# Patient Record
Sex: FEMALE | Race: BLACK OR AFRICAN AMERICAN | NOT HISPANIC OR LATINO | Employment: FULL TIME | URBAN - METROPOLITAN AREA
[De-identification: names, ages, dates, MRNs, and addresses within clinical notes are randomized per-mention and may not be internally consistent; named-entity substitution may affect disease eponyms.]

---

## 2020-09-20 ENCOUNTER — HOSPITAL ENCOUNTER (EMERGENCY)
Facility: HOSPITAL | Age: 65
Discharge: HOME | End: 2020-09-21
Attending: STUDENT IN AN ORGANIZED HEALTH CARE EDUCATION/TRAINING PROGRAM
Payer: COMMERCIAL

## 2020-09-20 DIAGNOSIS — R03.0 ELEVATED BLOOD PRESSURE READING: ICD-10-CM

## 2020-09-20 DIAGNOSIS — M25.559 HIP PAIN: ICD-10-CM

## 2020-09-20 LAB
ALBUMIN SERPL BCP-MCNC: 4.4 G/DL (ref 3.5–5.2)
ALP SERPL-CCNC: 56 U/L (ref 55–135)
ALT SERPL W/O P-5'-P-CCNC: 18 U/L (ref 10–44)
ANION GAP SERPL CALC-SCNC: 14 MMOL/L (ref 8–16)
AST SERPL-CCNC: 19 U/L (ref 10–40)
BASOPHILS # BLD AUTO: 0.05 K/UL (ref 0–0.2)
BASOPHILS NFR BLD: 0.6 % (ref 0–1.9)
BILIRUB SERPL-MCNC: 0.4 MG/DL (ref 0.1–1)
BILIRUB UR QL STRIP: NEGATIVE
BUN SERPL-MCNC: 11 MG/DL (ref 8–23)
CALCIUM SERPL-MCNC: 9.9 MG/DL (ref 8.7–10.5)
CHLORIDE SERPL-SCNC: 103 MMOL/L (ref 95–110)
CK SERPL-CCNC: 150 U/L (ref 20–180)
CLARITY UR: CLEAR
CO2 SERPL-SCNC: 22 MMOL/L (ref 23–29)
COLOR UR: COLORLESS
CREAT SERPL-MCNC: 0.8 MG/DL (ref 0.5–1.4)
D DIMER PPP IA.FEU-MCNC: 0.4 MG/L FEU
DIFFERENTIAL METHOD: ABNORMAL
EOSINOPHIL # BLD AUTO: 0.2 K/UL (ref 0–0.5)
EOSINOPHIL NFR BLD: 1.7 % (ref 0–8)
ERYTHROCYTE [DISTWIDTH] IN BLOOD BY AUTOMATED COUNT: 14 % (ref 11.5–14.5)
EST. GFR  (AFRICAN AMERICAN): >60 ML/MIN/1.73 M^2
EST. GFR  (NON AFRICAN AMERICAN): >60 ML/MIN/1.73 M^2
GLUCOSE SERPL-MCNC: 97 MG/DL (ref 70–110)
GLUCOSE UR QL STRIP: NEGATIVE
HCT VFR BLD AUTO: 39 % (ref 37–48.5)
HGB BLD-MCNC: 12.4 G/DL (ref 12–16)
HGB UR QL STRIP: NEGATIVE
IMM GRANULOCYTES # BLD AUTO: 0.04 K/UL (ref 0–0.04)
IMM GRANULOCYTES NFR BLD AUTO: 0.5 % (ref 0–0.5)
KETONES UR QL STRIP: NEGATIVE
LEUKOCYTE ESTERASE UR QL STRIP: NEGATIVE
LYMPHOCYTES # BLD AUTO: 4.9 K/UL (ref 1–4.8)
LYMPHOCYTES NFR BLD: 56 % (ref 18–48)
MCH RBC QN AUTO: 29.2 PG (ref 27–31)
MCHC RBC AUTO-ENTMCNC: 31.8 G/DL (ref 32–36)
MCV RBC AUTO: 92 FL (ref 82–98)
MONOCYTES # BLD AUTO: 0.8 K/UL (ref 0.3–1)
MONOCYTES NFR BLD: 9.5 % (ref 4–15)
NEUTROPHILS # BLD AUTO: 2.8 K/UL (ref 1.8–7.7)
NEUTROPHILS NFR BLD: 31.7 % (ref 38–73)
NITRITE UR QL STRIP: NEGATIVE
NRBC BLD-RTO: 0 /100 WBC
PH UR STRIP: 7 [PH] (ref 5–8)
PLATELET # BLD AUTO: 304 K/UL (ref 150–350)
PMV BLD AUTO: 10.2 FL (ref 9.2–12.9)
POTASSIUM SERPL-SCNC: 3.8 MMOL/L (ref 3.5–5.1)
PROT SERPL-MCNC: 7.6 G/DL (ref 6–8.4)
PROT UR QL STRIP: NEGATIVE
RBC # BLD AUTO: 4.25 M/UL (ref 4–5.4)
SODIUM SERPL-SCNC: 139 MMOL/L (ref 136–145)
SP GR UR STRIP: 1 (ref 1–1.03)
TROPONIN I SERPL DL<=0.01 NG/ML-MCNC: 0.01 NG/ML (ref 0–0.03)
URN SPEC COLLECT METH UR: ABNORMAL
UROBILINOGEN UR STRIP-ACNC: NEGATIVE EU/DL
WBC # BLD AUTO: 8.7 K/UL (ref 3.9–12.7)

## 2020-09-20 PROCEDURE — 93010 ELECTROCARDIOGRAM REPORT: CPT | Mod: ,,, | Performed by: INTERNAL MEDICINE

## 2020-09-20 PROCEDURE — 85025 COMPLETE CBC W/AUTO DIFF WBC: CPT

## 2020-09-20 PROCEDURE — 99284 EMERGENCY DEPT VISIT MOD MDM: CPT | Mod: 25

## 2020-09-20 PROCEDURE — 93010 EKG 12-LEAD: ICD-10-PCS | Mod: ,,, | Performed by: INTERNAL MEDICINE

## 2020-09-20 PROCEDURE — 80053 COMPREHEN METABOLIC PANEL: CPT

## 2020-09-20 PROCEDURE — 84484 ASSAY OF TROPONIN QUANT: CPT

## 2020-09-20 PROCEDURE — 85379 FIBRIN DEGRADATION QUANT: CPT

## 2020-09-20 PROCEDURE — 96375 TX/PRO/DX INJ NEW DRUG ADDON: CPT

## 2020-09-20 PROCEDURE — 25000003 PHARM REV CODE 250: Performed by: STUDENT IN AN ORGANIZED HEALTH CARE EDUCATION/TRAINING PROGRAM

## 2020-09-20 PROCEDURE — 96374 THER/PROPH/DIAG INJ IV PUSH: CPT

## 2020-09-20 PROCEDURE — 93005 ELECTROCARDIOGRAM TRACING: CPT

## 2020-09-20 PROCEDURE — 63600175 PHARM REV CODE 636 W HCPCS: Performed by: STUDENT IN AN ORGANIZED HEALTH CARE EDUCATION/TRAINING PROGRAM

## 2020-09-20 PROCEDURE — 81003 URINALYSIS AUTO W/O SCOPE: CPT

## 2020-09-20 PROCEDURE — 82550 ASSAY OF CK (CPK): CPT

## 2020-09-20 RX ORDER — HYDROMORPHONE HYDROCHLORIDE 2 MG/ML
0.5 INJECTION, SOLUTION INTRAMUSCULAR; INTRAVENOUS; SUBCUTANEOUS
Status: COMPLETED | OUTPATIENT
Start: 2020-09-20 | End: 2020-09-20

## 2020-09-20 RX ORDER — LISINOPRIL 20 MG/1
20 TABLET ORAL DAILY
COMMUNITY

## 2020-09-20 RX ORDER — HYDROCHLOROTHIAZIDE 12.5 MG/1
12.5 TABLET ORAL DAILY
COMMUNITY

## 2020-09-20 RX ORDER — ONDANSETRON 2 MG/ML
8 INJECTION INTRAMUSCULAR; INTRAVENOUS
Status: COMPLETED | OUTPATIENT
Start: 2020-09-20 | End: 2020-09-20

## 2020-09-20 RX ORDER — ONDANSETRON 4 MG/1
4 TABLET, ORALLY DISINTEGRATING ORAL
Status: COMPLETED | OUTPATIENT
Start: 2020-09-20 | End: 2020-09-20

## 2020-09-20 RX ORDER — ACETAMINOPHEN 500 MG
1000 TABLET ORAL
Status: COMPLETED | OUTPATIENT
Start: 2020-09-20 | End: 2020-09-20

## 2020-09-20 RX ORDER — DEXAMETHASONE SODIUM PHOSPHATE 4 MG/ML
8 INJECTION, SOLUTION INTRA-ARTICULAR; INTRALESIONAL; INTRAMUSCULAR; INTRAVENOUS; SOFT TISSUE
Status: COMPLETED | OUTPATIENT
Start: 2020-09-20 | End: 2020-09-20

## 2020-09-20 RX ORDER — MORPHINE SULFATE 10 MG/ML
5 INJECTION INTRAMUSCULAR; INTRAVENOUS; SUBCUTANEOUS
Status: COMPLETED | OUTPATIENT
Start: 2020-09-20 | End: 2020-09-20

## 2020-09-20 RX ORDER — METHOCARBAMOL 750 MG/1
750 TABLET, FILM COATED ORAL
Status: COMPLETED | OUTPATIENT
Start: 2020-09-20 | End: 2020-09-20

## 2020-09-20 RX ORDER — LEVOTHYROXINE SODIUM 75 UG/1
75 TABLET ORAL
COMMUNITY

## 2020-09-20 RX ADMIN — ONDANSETRON 8 MG: 2 INJECTION INTRAMUSCULAR; INTRAVENOUS at 08:09

## 2020-09-20 RX ADMIN — MORPHINE SULFATE 5 MG: 10 INJECTION INTRAVENOUS at 06:09

## 2020-09-20 RX ADMIN — ACETAMINOPHEN 1000 MG: 500 TABLET ORAL at 11:09

## 2020-09-20 RX ADMIN — ONDANSETRON 4 MG: 4 TABLET, ORALLY DISINTEGRATING ORAL at 10:09

## 2020-09-20 RX ADMIN — HYDROMORPHONE HYDROCHLORIDE 0.5 MG: 2 INJECTION, SOLUTION INTRAMUSCULAR; INTRAVENOUS; SUBCUTANEOUS at 07:09

## 2020-09-20 RX ADMIN — DEXAMETHASONE SODIUM PHOSPHATE 8 MG: 4 INJECTION, SOLUTION INTRAMUSCULAR; INTRAVENOUS at 10:09

## 2020-09-20 RX ADMIN — METHOCARBAMOL 750 MG: 750 TABLET ORAL at 11:09

## 2020-09-20 NOTE — ED PROVIDER NOTES
Encounter Date: 9/20/2020    SCRIBE #1 NOTE: I, Savage Lewis, am scribing for, and in the presence of,  Lisa Marina DO. I have scribed the following portions of the note - Other sections scribed: HPI, ROS, PE.       History     Chief Complaint   Patient presents with    Back Pain     lower back pain readiating down left hip to foot.sent from [facility] refused to give steriod shot due to elevated b/p 198/102     Faye Bonner is an 64 y.o. female presenting to the ED complaining of a sudden onset of worsening left hip pain persisting for two weeks. Patient describes complaint as a sharp pain radiating down to the pelvis and leg. Patient previously presented to Urgent Care earlier today but was refused a steroid shot for pain control due to her elevated blood pressure. Patient reports prior to complaint she recently finished a seven to eight hour car ride from [place], [place]. Patient reports symptoms of difficult walking, urinary frequency and abdominal discomfort described as a pressing sensation. Patient denies recent fall or injury, fever, chills, shortness of breath, chest pain, dysuria, hematuria, nausea, emesis, diarrhea, leg swelling, weakness, or numbness. Patient reports prior attempt at treatment via Tylenol without relief. Patient endorses constipation unchanging from baseline. Patient states complaint worsens when moving a certain way, laying down flat on stomach, turning and rolling to another side, and standing up straight. Patient states complaint alleviates when laying on back. Patient reports prior history to similar complaint but with lessened intensity. Patient is compliant with home medication, but states she did miss her home antihypertensive today. Patient is currently on not on hormone therapy. Patient reports a past medical history of sciatic nerve pain, hypertension, hyperlipidemia, and thyroid disease. Patient denies past medical history of deep vein thrombosis,  pulmonary embolism, or carcinoma. Patient reports a past surgical history of hernia removal surgery, thyroid surgery, and ear surgery. No known drug allergies. No tobacco, EtOH, or street drug abuse.      The history is provided by the patient.     Review of patient's allergies indicates:  No Known Allergies  Past Medical History:   Diagnosis Date    Hypertension     Thyroid disease      History reviewed. No pertinent surgical history.  History reviewed. No pertinent family history.  Social History     Tobacco Use    Smoking status: Never Smoker    Smokeless tobacco: Never Used   Substance Use Topics    Alcohol use: Never     Frequency: Never    Drug use: Never     Review of Systems   Constitutional: Negative for chills and fever.   HENT: Negative for drooling.    Eyes: Negative for visual disturbance.   Respiratory: Negative for shortness of breath.    Cardiovascular: Negative for chest pain and leg swelling.   Gastrointestinal: Positive for constipation. Negative for abdominal pain, diarrhea, nausea and vomiting.   Genitourinary: Positive for frequency and pelvic pain. Negative for dysuria and hematuria.   Musculoskeletal: Positive for arthralgias. Negative for back pain, myalgias and neck pain.   Skin: Negative for rash and wound.   Neurological: Negative for dizziness, seizures, syncope, facial asymmetry, weakness, numbness and headaches.   Psychiatric/Behavioral: Negative for confusion.       Physical Exam     Initial Vitals   BP Pulse Resp Temp SpO2   09/20/20 1736 09/20/20 1736 09/20/20 1736 09/20/20 1734 09/20/20 1736   (!) 209/93 80 20 98.1 °F (36.7 °C) 99 %      MAP       --                Physical Exam    Nursing note and vitals reviewed.  Constitutional: She appears well-developed and well-nourished.  Non-toxic appearance. She does not have a sickly appearance. She does not appear ill. She appears distressed.   Patient is uncomfortable and is unable to sit comfortably in stretcher.   HENT:   Head:  Normocephalic and atraumatic.   Nose: Nose normal.   Eyes: Conjunctivae and EOM are normal. Pupils are equal, round, and reactive to light. No scleral icterus.   Neck: Normal range of motion. Neck supple. No tracheal deviation present.   Cardiovascular: Normal rate, regular rhythm and normal heart sounds. Exam reveals no gallop and no friction rub.    No murmur heard.  Pulmonary/Chest: Breath sounds normal. No respiratory distress. She has no wheezes. She has no rhonchi. She has no rales.   Abdominal: Soft. Bowel sounds are normal. She exhibits no distension. There is no abdominal tenderness. There is no rebound and no guarding.   Musculoskeletal: Normal range of motion. Tenderness present. No edema.      Left hip: She exhibits tenderness.      Comments: Tenderness in anterior aspect of the left hip and around lateral aspect of the left greater trochanter. Negative straight leg test and logroll.   Neurological: She is alert and oriented to person, place, and time. She has normal strength. She displays no atrophy and no tremor. No cranial nerve deficit or sensory deficit. She exhibits normal muscle tone. She displays no seizure activity.   Skin: Skin is warm and dry. No rash noted. No erythema.   No diaphoresis.   Psychiatric: She has a normal mood and affect. Her behavior is normal. Judgment and thought content normal.         ED Course   Procedures  Labs Reviewed   URINALYSIS, REFLEX TO URINE CULTURE - Abnormal; Notable for the following components:       Result Value    Color, UA Colorless (*)     All other components within normal limits    Narrative:     Specimen Source->Urine   COMPREHENSIVE METABOLIC PANEL - Abnormal; Notable for the following components:    CO2 22 (*)     All other components within normal limits   CBC W/ AUTO DIFFERENTIAL - Abnormal; Notable for the following components:    Mean Corpuscular Hemoglobin Conc 31.8 (*)     Lymph # 4.9 (*)     Gran% 31.7 (*)     Lymph% 56.0 (*)     All other  components within normal limits   D DIMER, QUANTITATIVE   TROPONIN I   CK        ECG Results          EKG 12-lead (Final result)  Result time 09/21/20 19:58:44    Final result by Interface, Lab In [facility] (09/21/20 19:58:44)                 Narrative:    Test Reason : R03.0,    Vent. Rate : 087 BPM     Atrial Rate : 087 BPM     P-R Int : 194 ms          QRS Dur : 074 ms      QT Int : 404 ms       P-R-T Axes : 063 048 015 degrees     QTc Int : 486 ms    Normal sinus rhythm  Nonspecific T wave abnormality  Prolonged QT  Abnormal ECG  No previous ECGs available  Confirmed by Dandre Gonzales MD ([id number]) on 9/21/2020 7:58:37 PM    Referred By: [email or web address]   SELF           Confirmed By:Dandre Gonzales MD                            Imaging Results          X-Ray Chest PA And Lateral (Final result)  Result time 09/20/20 19:36:38    Final result by Momo Mcconnell MD (09/20/20 19:36:38)                 Impression:      No acute process.      Electronically signed by: Momo Mcconnell MD  Date:    09/20/2020  Time:    19:36             Narrative:    EXAMINATION:  XR CHEST PA AND LATERAL    CLINICAL HISTORY:  Elevated blood-pressure reading, without diagnosis of hypertension    TECHNIQUE:  PA and lateral views of the chest were performed.    COMPARISON:  None    FINDINGS:  The trachea is unremarkable.  The cardiomediastinal silhouette is within normal limits.  The hilar structures are unremarkable.  The hemidiaphragms are within normal limits.  There is no evidence of free air beneath the hemidiaphragms.  There are no pleural effusions.  There is no evidence of a pneumothorax.  There is no evidence of pneumomediastinum.  No airspace opacity is present.  There are degenerative changes in the osseous structures.                               X-Ray Hip 2 View Left (Final result)  Result time 09/20/20 19:31:04    Final result by Moise Manuel MD (09/20/20 19:31:04)                 Impression:      1. No acute displaced fracture or  dislocation of the left hip noting high attenuating appearance of the left gluteal soft tissues may be related to technique however correlation for contusion recommended.      Electronically signed by: Moise Manuel MD  Date:    09/20/2020  Time:    19:31             Narrative:    EXAMINATION:  XR HIP 2 VIEW LEFT    CLINICAL HISTORY:  Pain in unspecified hip    TECHNIQUE:  AP view of the pelvis and frog leg lateral view of the left hip were performed.    COMPARISON:  None    FINDINGS:  Three views left hip.    The bilateral sacroiliac joints are intact.  The pubic symphysis is intact.  The bilateral femoroacetabular joints are intact.  There is high attenuation of the left gluteal soft tissues, may be on the basis of positioning.                                 Medical Decision Making:   History:   Old Medical Records: I decided to obtain old medical records.  Clinical Tests:   Lab Tests: Ordered and Reviewed  Radiological Study: Ordered and Reviewed  Medical Tests: Ordered and Reviewed  ED Management:  This is an emergent evaluation of a 64 y.o. female presenting to the ED complaining of a sudden onset of worsening left hip pain persisting for two weeks with no report recent injury.  Initial vitals in the department with temperature 98.1°, pulse 80 with respirations of 20,  a blood pressure of 209/93, and pulse ox 99% on room air. Physical exam notable for a nontoxic-appearing female who does appear distress due to pain to patient unable to sit comfortably on the stretcher. There is tenderness in anterior aspect of the left hip and around lateral aspect of the left greater trochanter. Negative straight leg test and logroll. Remainder of exam benign. DDx includes but is not limited to musculoskeletal pain, occult fracture vs dislocation, UTI, kidney stone, rhabdo, hypertensive emergency versus urgency, septic joint. Given history and exam, labs and imaging including CBC, CMP, troponin, CK, D-dimer, UA, chest  x-ray, left hip x-ray, and EKG.  Labs were unremarkable.  Chest x-ray showed no acute processes.  X-ray of the left hip did show high attenuation of the left gluteal soft tissue however likely related to technique of the film, given on exam patient had no overlying skin changes, erythema, induration.  Patient's symptoms were treated initially with IV morphine however patient had no relief for her symptoms.  She did received IV Dilaudid and became nauseated and was treated with IV Zofran.  On reassessment patient symptoms were improved but not resolved.  Repeat blood pressure was 162/75. Given benign workup and improvement of the patient's blood pressure I decided to treat her pain with IM Decadron, p.o. Robaxin and Tylenol.  Patient offered her home blood pressure medicine given that she did not take it today however states that she will take her home blood pressure medicine once discharged.  Patient was able to tolerate p.o. while in an apartment.  Given benign workup and improvement of symptoms I feel patient's presentation is likely due to musculoskeletal pain.  Will discharge with a short course of Norco, Robaxin and Zofran.  Patient also given ED return precautions and close PCP follow-up. She expressed understand and is agreeable to plan.  Patient observed ambulating in the department without difficulty or assistance.      Lisa Marina D.O  EMERGENCY MEDICINE   11:59 PM 09/20/2020                Scribe Attestation:   Scribe #1: I performed the above scribed service and the documentation accurately describes the services I performed. I attest to the accuracy of the note.                      Clinical Impression:     ICD-10-CM ICD-9-CM   1. Elevated blood pressure reading  R03.0 796.2   2. Hip pain  M25.559 719.45                          ED Disposition Condition    Discharge Stable        ED Prescriptions     Medication Sig Dispense Start Date End Date Auth. Provider    HYDROcodone-acetaminophen  (NORCO) 5-325 mg per tablet () Take 1 tablet by mouth every 6 (six) hours as needed for Pain. 12 tablet 2020 Lisa Marina,     methocarbamoL (ROBAXIN) 750 MG Tab (Expires today) Take 1 tablet (750 mg total) by mouth 3 (three) times daily as needed (hip paim). 15 tablet 2020 Lisa Marina DO    ondansetron (ZOFRAN) 4 MG tablet () Take 1 tablet (4 mg total) by mouth every 6 (six) hours as needed for Nausea. 12 tablet 2020 Lisa Marina DO        Follow-up Information     Follow up With Specialties Details Why Contact Info    [facility] Emergency Medicine Go to  If symptoms worsen [facility] [address]  [facility] [id number] [phone]    Your Primary Care Doctor  Schedule an appointment as soon as possible for a visit in 2 days Emergency Room Follow-up                          I, Lisa Marina DO, personally performed the services described in this documentation. All medical record entries made by the scribe were at my direction and in my presence. I have reviewed the chart and agree that the record reflects my personal performance and is accurate and complete.                 Lisa Marina DO  [id number]

## 2020-09-20 NOTE — ED TRIAGE NOTES
Patient states pain in the hip down the left leg that started earlier in the week. Went to  was sent to ER secondary to elevated BP, states just took her BP meds at 4pm due to no bathroom went traveling from [place].

## 2020-09-21 VITALS
WEIGHT: 154 LBS | HEART RATE: 78 BPM | DIASTOLIC BLOOD PRESSURE: 86 MMHG | HEIGHT: 65 IN | BODY MASS INDEX: 25.66 KG/M2 | TEMPERATURE: 99 F | RESPIRATION RATE: 18 BRPM | OXYGEN SATURATION: 96 % | SYSTOLIC BLOOD PRESSURE: 179 MMHG

## 2020-09-21 RX ORDER — HYDROCODONE BITARTRATE AND ACETAMINOPHEN 5; 325 MG/1; MG/1
1 TABLET ORAL EVERY 6 HOURS PRN
Qty: 12 TABLET | Refills: 0 | Status: SHIPPED | OUTPATIENT
Start: 2020-09-21 | End: 2020-09-24

## 2020-09-21 RX ORDER — METHOCARBAMOL 750 MG/1
750 TABLET, FILM COATED ORAL 3 TIMES DAILY PRN
Qty: 15 TABLET | Refills: 0 | Status: SHIPPED | OUTPATIENT
Start: 2020-09-21 | End: 2020-09-26

## 2020-09-21 RX ORDER — ONDANSETRON 4 MG/1
4 TABLET, FILM COATED ORAL EVERY 6 HOURS PRN
Qty: 12 TABLET | Refills: 0 | Status: SHIPPED | OUTPATIENT
Start: 2020-09-21 | End: 2020-09-24

## 2020-09-21 NOTE — DISCHARGE INSTRUCTIONS
Please return to the ER if you have worsening symptoms, chest pain, shortness of breath, increasing elevation in blood pressure with medication, inability to keep down fluids, inability to urinate, if you pass out.

## 2020-09-21 NOTE — ED NOTES
Pt ambulated with steady gait to wheelchair, AAO x 4, in no acute distress, verbalized understanding of d/c instructions.